# Patient Record
(demographics unavailable — no encounter records)

---

## 2017-12-03 NOTE — PHYS DOC
Past History


Past Medical History:  No Pertinent History


Past Surgical History:  No Surgical History


Smoking:  Non-smoker


Alcohol Use:  None


Drug Use:  None





Adult General


Chief Complaint


Chief Complaint:  ALLERGIC REACTION





HPI


HPI





Patient is a 8 year old female who presents with her parents for allergic 

reaction.  The patient ate a Sangeeta bar containing cashews around 1700, had 2 

episodes of vomiting, later mother noted urticaria to her torso & a subsequent 

episode of vomiting.  They present here about 4 hours after onset of symptoms, 

still has urticaria after taking benadryl at home.  Denies face/tongue/lip 

swelling, throat tightness, shortness of breath, wheezing, diarrhea.  She has 

known peanut/tree nut allergy, has an epi pen that she has never had to use.





Review of Systems


Review of Systems


Constitutional: Denies fever or chills 


HENT: Denies nasal congestion or sore throat 


Respiratory: Denies cough or shortness of breath 


Cardiovascular:  Denies chest pain


GI: Reports nausea & vomiting.  Denies abdominal pain, or diarrhea


Musculoskeletal: Denies back pain or joint pain 


Integument: Reports urticaria


Neurologic: Denies headache





All other systems were reviewed and found to be within normal limits, except as 

documented in this note.





Current Medications


Current Medications





Current Medications








 Medications


  (Trade)  Dose


 Ordered  Sig/Kranthi  Start Time


 Stop Time Status Last Admin


Dose Admin


 


 Diphenhydramine


 HCl


  (Benadryl Oral


 Elixir)  25 mg  1X  ONCE  12/3/17 21:30


 12/3/17 22:05 DC 12/3/17 21:37


25 MG


 


 Famotidine


  (Pepcid)  20 mg  1X  ONCE  12/3/17 21:30


 12/3/17 22:05 DC 12/3/17 21:37


20 MG


 


 Prednisolone


 Sodium Phosphate


  (Orapred)  56 mg  1X  ONCE  12/3/17 21:30


 12/3/17 22:05 DC 12/3/17 21:41


56 MG











Allergies


Allergies





Allergies








Coded Allergies Type Severity Reaction Last Updated Verified


 


  tree nut Allergy Unknown  12/3/17 Yes











Physical Exam


Physical Exam


Constitutional: Well developed, well nourished, no acute distress, non-toxic 

appearance. 


HENT: Normocephalic, atraumatic, bilateral external ears normal, oropharynx 

moist, nose normal.  no face/tongue/lip swelling.


Eyes: conjunctiva normal, no discharge.


Neck: supple, no stridor.


Cardiovascular:  RRR, no murmurs, no edema. 


Lungs & Thorax:  LCTAB, no wheezing, no respiratory distress.


Abdomen: soft, nontender, nondistended.


Skin: urticaria to torso & extremities


Back: No tenderness.


Extremities: No tenderness


Neurologic: Alert and oriented X 3





Current Patient Data


Vital Signs





 Vital Signs








  Date Time  Temp Pulse Resp B/P (MAP) Pulse Ox O2 Delivery O2 Flow Rate FiO2


 


12/3/17 21:58     95   


 


12/3/17 21:13 98.8       











EKG


EKG


[]





Radiology/Procedures


Radiology/Procedures


[]





Course & Med Decision Making


Course & Med Decision Making


Pertinent Labs and Imaging studies reviewed. (See chart for details)


The patient presents with allergic reaction likely to nuts in granola bar.  She 

is well appearing, stable vitals, no respiratory symptoms, but does have 

evidence of multi system involvement.  Discussed at length with parents - 

multiple symptoms would be indication for epi pen, but exposure was 4 hours ago 

& her symptoms have not progressed to involve respiratory system.  We decided 

to give benadryl, pepcid, & prednisolone here, & observe.  She was monitored 

for greater than 1 hour after medication administration, hives resolving, no 

further GI symptoms, no new respiratory symptoms.  Parents were comfortable 

with discharge home & have epi pen, understand indications for use.  Recommend 

rest, hydration, continue benadryl, gave prescription for prednisolone burst.  

Follow up with pediatrician in 2-3 days.  Come back for face/tongue/lip swelling

, severe shortness of breath, any otherwise worsening condition.  Discharged 

home in stable condition.


[]





Dragon Disclaimer


Dragon Disclaimer


This electronic medical record was generated, in whole or in part, using a 

voice recognition dictation system.





Departure


Departure:


Impression:  


 Primary Impression:  


 Allergic reaction to food


Disposition:  01 HOME, SELF-CARE


Condition:  IMPROVED


Referrals:  


RONALD MOORE (PCP)


Patient Instructions:  Food Allergy and Anaphylaxis





Additional Instructions:  


Yuliya was seen in the emergency department today for allergic reaction.  She 

improved with treatment here.  Please have her rest, drink fluids, continue to 

give benadryl as needed for the next 4 days.  Give prescribed steroids.  Follow 

up with her pediatrician in 2-3 days.  Come back for face/tongue/lip swelling, 

severe shortness of breath, uncontrolled vomiting, any otherwise worsening 

condition.


Scripts


Prednisolone Sod Phosphate (MILLIPRED) 10 Mg/5 Ml Solution


50 MG PO DAILY for 4 Days, #100 ML


   Prov: CAMILLE GABRIEL MD         12/3/17





Problem Qualifiers








 Primary Impression:  


 Allergic reaction to food


 Encounter type:  initial encounter  Qualified Codes:  T78.1XXA - Other adverse 

food reactions, not elsewhere classified, initial encounter








CAMILLE GABRIEL MD Dec 3, 2017 22:36

## 2018-02-05 NOTE — PHYS DOC
Past History


Past Medical History:  No Pertinent History


Past Surgical History:  No Surgical History


Smoking:  Non-smoker


Alcohol Use:  None


Drug Use:  None





General Pediatric Assessment


History of Present Illness





Patient is a 9-year-old female presenting to the emergency department for 

evaluation of one day's worth of fevers chills cough congestion sore throat and 

decreased by mouth intake because of some nausea.  Child is healthy with no 

long problems diagnosed diabetes or immune system issues.  She had a dose of 

Tylenol earlier this morning for fever but nothing since.  She has up-to-date 

immunizations other than not getting an influenza vaccine this year.  She is in 

no obvious distress but does have a fever with tachycardia.


Review of Systems





Constitutional: + fever, chills []


HENT: + nasal congestion, sore throat []


Respiratory: + cough.  No shortness of breath []


Cardiovascular: No additional information not addressed in HPI []


GI: Denies abdominal pain.  + nausea.  No vomiting, bloody stools or diarrhea []





All other systems were reviewed and found to be within normal limits, except as 

documented in this note.


Current Medications





Current Medications








 Medications


  (Trade)  Dose


 Ordered  Sig/Kranthi  Start Time


 Stop Time Status Last Admin


Dose Admin


 


 Ondansetron HCl


  (Zofran Odt)  4 mg  1X  ONCE  2/5/18 19:30


 2/5/18 19:31 DC 2/5/18 19:59


4 MG








Allergies





Allergies








Coded Allergies Type Severity Reaction Last Updated Verified


 


  tree nut Allergy Unknown  12/3/17 Yes








Physical Exam





Constitutional: Well developed, well nourished, no acute distress, non-toxic 

appearance, positive interaction, playful.


HENT: Normocephalic, atraumatic, bilateral external ears normal, oropharynx 

moist, no oral exudates, nose congested


Neck: Normal range of motion, no tenderness, supple, no stridor.


Cardiovascular: Tachy heart rate, normal rhythm, no murmurs, no rubs, no 

gallops.


Thorax and Lungs: Normal breath sounds, no respiratory distress, no wheezing, 

no chest tenderness, no retractions, no accessory muscle use.


Abdomen: Bowel sounds normal, soft, no tenderness, no masses, no pulsatile 

masses.


Skin: Warm, dry, no erythema, no rash.


Radiology/Procedures


[]


Current Patient Data





Laboratory Tests








Test


  2/5/18


19:17 2/5/18


19:27


 


Group A Streptococcus Rapid


  Negative


(NEGATIVE) 


 


 


Influenza Type A (Rapid)


  


  Positive


(NEGATIVE)


 


Influenza Type B (Rapid)


  


  Negative


(NEGATIVE)








Active Scripts








 Medications  Dose


 Route/Sig


 Max Daily Dose Days Date Category


 


 Millipred


  (Prednisolone Sod


 Phosphate) 10


 Mg/5 Ml Solution  50 Mg


 PO DAILY


   4 12/3/17 Rx








Course & Med Decision Making


Patient with symptoms classic for influenza which she did swab positive for 

influenza A.  I discussed the risks and benefits of Tamiflu with parents and 

they did not want to pursue Tamiflu at this time and did not want me to give 

her first dose here.  I did write them a prescription in case they did change 

her mind bend told them that the medication is more effective that earlier it 

starts.  I recommended drinking plenty of fluids alternating Tylenol and 

ibuprofen for fever following with primary care provider within 2-3 days and 

come back to the ED sooner with worsening pain shortness of breath or other 

general concerns.  Parents aware and agreeable with plan and verbalized 

understanding of the above instructions.





Departure


Departure:


Impression:  


 Primary Impression:  


 Influenza A


Disposition:  01 HOME, SELF-CARE


Condition:  STABLE


Referrals:  


RONALD MOORE (PCP)


Patient Instructions:  Influenza A (H1N1)





Additional Instructions:  


ALTERNATE TYLENOL AND IBUPROFEN FOR FEVER/PAIN.  DRINK PLENTY OF FLUIDS AND EAT 

A GOOD DIET.  YOU CAN USE OTC NASONEX OR SALINE FOR YOUR CONGESTION.  FOLLOW 

WITH YOUR PCP LATER THIS WEEK TO ENSURE IMPROVEMENT AND COME BACK TO THE ED 

WITH WORSENING PAIN, DIFFICULTY BREATHING, OR OTHER GENERAL CONCERNS.


Scripts


Oseltamivir Phosphate (TAMIFLU) 30 Mg Capsule


2 CAP PO BID, #20 CAP


   Prov: CAESAR BALLARD DO         2/5/18 


Ondansetron (ZOFRAN ODT) 4 Mg Tab.rapdis


1 TAB SL Q8HRS, #10 TAB


   Prov: CAESAR BALLARD DO         2/5/18











CAESAR BALLARD DO Feb 5, 2018 20:34

## 2018-03-08 NOTE — ED.ADGEN
Past History


Past Medical History:  No Pertinent History


Past Surgical History:  No Surgical History


Smoking:  Non-smoker


Alcohol Use:  None


Drug Use:  None





Adult General


Chief Complaint


Chief Complaint


" We were at the doctor the other day.. for her cold.. and ear pain.. but... 

they did not think she had pink eye yet.. and they gave us a script for the 

ear... but it just got filled.. but I want her eyes rechecked.. "  (Mother)





[facility]


HPI





Patient is a 9 year old female who presents with above hx and complaints of Rt 

ear pain, ext. otitis and now conjunctivitis.  Patient is normally healthy. No 

recent travel. No specific ill contacts. Patient is around other children 

school that been sick. Patient normally follows at [facility] for care. Up-to

-date with vaccinations. But did not receive flu vaccination this fall. Patient 

with other prescribed Cortisporin for external otitis right ear however the 

prescription was not filled until tonight. Patient does have some mild 

conjunctivitis. There is no history of trauma. There is no history of visual 

changes. Patient is normally healthy. Patient symptoms have been present for 

the past 4-5 days.  Patient with acuity is 20/20 at bedside.





Review of Systems


Review of Systems





Constitutional: Denies fever or chills []


Eyes: Denies change in visual acuity, complaints of conjunctivitis, or eye pain 

[]


HENT: History of nasal congestion, right otitis, 


Respiratory: Denies cough or shortness of breath []


Cardiovascular: No additional information not addressed in HPI []


GI: Denies abdominal pain, nausea, vomiting, bloody stools or diarrhea []


: Denies dysuria or hematuria []


Musculoskeletal: Denies back pain or joint pain []


Integument: Denies rash or skin lesions []


Neurologic: Denies headache, focal weakness or sensory changes []


Endocrine: Denies polyuria or polydipsia []





All other systems were reviewed and found to be within normal limits, except as 

documented in this note.





Family History


Family History


Noncontributory





Current Medications


Current Medications





Current Medications








 Medications


  (Trade)  Dose


 Ordered  Sig/Kranthi  Start Time


 Stop Time Status Last Admin


Dose Admin


 


 Diphenhydramine


 HCl


  (Benadryl Oral


 Elixir)  25 mg  1X  ONCE  3/8/18 18:45


 3/8/18 18:46 DC 3/8/18 18:43


25 MG


 


 Erythromycin


  (Romycin)  0.25 inch  1X  ONCE  3/8/18 18:45


 3/8/18 18:46 DC 3/8/18 18:44


0.25 INCH


 


 Ibuprofen


  (Motrin)  200 mg  1X  ONCE  3/8/18 18:45


 3/8/18 18:46 DC 3/8/18 18:44


200 MG





See nursing for home meds





Allergies


Allergies





Allergies








Coded Allergies Type Severity Reaction Last Updated Verified


 


  tree nut Allergy Unknown  12/3/17 Yes











Physical Exam


Physical Exam





Constitutional: Well developed, well nourished, no acute distress, non-toxic 

appearance. []


HENT: Normocephalic, atraumatic,  Lt external ears normal, oropharynx moist, no 

oral exudates, nose rhinorrhea. Injection of right ear canal and TM. A small 

amount of fluid behind both TMs


Eyes: PERRLA, EOMI, conjunctiva mild injection, no visual changes, no limbus 

injection, no discharge. [] 


Neck: Normal range of motion, no tenderness, supple, no stridor. [] 


Cardiovascular:Heart rate regular rhythm, no murmur []


Lungs & Thorax:  Bilateral breath sounds clear to auscultation []


Abdomen: Bowel sounds normal, soft, no tenderness, no masses, no pulsatile 

masses. [] 


Skin: Warm, dry, no erythema, no rash. [] 


Back: No tenderness, no CVA tenderness. [] 


Extremities: No tenderness, no cyanosis, no clubbing, ROM intact, no edema. [] 


Neurologic: Alert and oriented X 3, normal motor function, normal sensory 

function, no focal deficits noted. []


Psychologic: Affect normal, judgement normal, mood normal. []





Current Patient Data


Vital Signs





 Vital Signs








  Date Time  Temp Pulse Resp B/P (MAP) Pulse Ox O2 Delivery O2 Flow Rate FiO2


 


3/8/18 19:00     96   


 


3/8/18 18:06 97.7       











EKG


EKG


[]





Radiology/Procedures


Radiology/Procedures


[]





Course & Med Decision Making


Course & Med Decision Making


Pertinent Labs and Imaging studies reviewed. (See chart for details).  Take over

-the-counter Tylenol and ibuprofen for discomfort. May take Benadryl up to 4 

times a day for congestion. Patient to gargle with Listerine or salt water. 

Patient to use Cortisporin ear drops as directed. Return if any concerns. Follow

-up primary care. May use a very small amount of erythromycin ointment to both 

eyes 4 times a day. Return if any concerns. Must do frequent handwashing.





[]





Final Impression


Final Impression


1. Right external otitis


2. Viral syndrome


3. Conjunctivitis-viral[]


Problems:  





Dragon Disclaimer


Dragon Disclaimer


This electronic medical record was generated, in whole or in part, using a 

voice recognition dictation system.











CARI GREEN MD Mar 8, 2018 18:08